# Patient Record
Sex: MALE | Race: WHITE | NOT HISPANIC OR LATINO | Employment: OTHER | URBAN - METROPOLITAN AREA
[De-identification: names, ages, dates, MRNs, and addresses within clinical notes are randomized per-mention and may not be internally consistent; named-entity substitution may affect disease eponyms.]

---

## 2024-09-23 ENCOUNTER — HOSPITAL ENCOUNTER (EMERGENCY)
Facility: HOSPITAL | Age: 29
Discharge: HOME | End: 2024-09-23
Attending: EMERGENCY MEDICINE

## 2024-09-23 VITALS
RESPIRATION RATE: 16 BRPM | HEIGHT: 74 IN | HEART RATE: 88 BPM | OXYGEN SATURATION: 95 % | WEIGHT: 240 LBS | BODY MASS INDEX: 30.8 KG/M2 | TEMPERATURE: 97 F | DIASTOLIC BLOOD PRESSURE: 95 MMHG | SYSTOLIC BLOOD PRESSURE: 165 MMHG

## 2024-09-23 DIAGNOSIS — R52 PAIN: ICD-10-CM
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
## 2024-09-23 DIAGNOSIS — S93.402A SPRAIN OF LEFT ANKLE, UNSPECIFIED LIGAMENT, INITIAL ENCOUNTER: Primary | ICD-10-CM

## 2024-09-23 PROCEDURE — 99283 EMERGENCY DEPT VISIT LOW MDM: CPT | Mod: 25

## 2024-09-23 RX ORDER — NAPROXEN 375 MG/1
375 TABLET ORAL 2 TIMES DAILY WITH MEALS
Qty: 20 TABLET | Refills: 0 | Status: SHIPPED | OUTPATIENT
Start: 2024-09-23 | End: 2024-10-03

## 2024-09-23 NOTE — DISCHARGE INSTRUCTIONS
Rest ice elevate extremity   Naprosyn as directed for pain and swelling  Follow up as directed   Return if condition becomes worse for any concerns

## 2024-09-23 NOTE — ED NOTES
Ace wrap applied to left foot/ankle.  Patient given crutches and educated on use.  After customizing size, patient demonstrated back correct usage.  Patient has no further questions or concerns.

## 2024-09-23 NOTE — ED PROVIDER NOTES
Encounter Date: 9/23/2024       History     Chief Complaint   Patient presents with    Ankle Injury     LEFT     29-year-old male presents emergency department with complaint of left ankle pain.  Patient states that he was attempting to do something with a trailer when he accidentally twisted his ankle.  Complaining of pain to the medial and lateral aspect of the ankle.  Patient reports that he has had previous ankle sprains.    The history is provided by the patient.     Review of patient's allergies indicates:  No Known Allergies  No past medical history on file.  No past surgical history on file.  No family history on file.     Review of Systems   Musculoskeletal:         Left ankle pain and swelling   All other systems reviewed and are negative.      Physical Exam     Initial Vitals [09/23/24 1343]   BP Pulse Resp Temp SpO2   (!) 165/95 88 16 97.4 °F (36.3 °C) 95 %      MAP       --         Physical Exam    Nursing note and vitals reviewed.  Constitutional: He appears well-developed and well-nourished.   HENT:   Head: Normocephalic and atraumatic.   Cardiovascular:  Normal rate and intact distal pulses.           Musculoskeletal:      Left ankle: Swelling and ecchymosis present. Tenderness present over the lateral malleolus and medial malleolus. No proximal fibula tenderness. Decreased range of motion.     Neurological: He is alert and oriented to person, place, and time. He has normal strength.   Skin: Skin is warm.   Ecchymosis noted to the medial aspect of the foot         ED Course   Splint Application    Date/Time: 9/23/2024 3:15 PM    Performed by: Charmaine Cook FNP  Authorized by: Jae Lama MD  Location details: left ankle  Supplies used: elastic bandage  Post-procedure: The splinted body part was neurovascularly unchanged following the procedure.  Patient tolerance: Patient tolerated the procedure well with no immediate complications  Comments: Patient provided crutches        Labs  Reviewed - No data to display       Imaging Results              X-Ray Foot Complete Left (Final result)  Result time 09/23/24 14:37:44      Final result by Davey Bradley DO (09/23/24 14:37:44)                   Impression:      No acute osseous abnormality      Electronically signed by: Davey Bradley  Date:    09/23/2024  Time:    14:37               Narrative:    EXAMINATION:  XR FOOT COMPLETE 3 VIEW LEFT    CLINICAL HISTORY:  Unspecified fall, initial encounter    FINDINGS:  Three views of the left foot show no fracture, dislocation, or destructive osseous lesion. Joint spaces of the foot are within normal limits.  No gross soft tissue abnormality.                                       X-Ray Ankle Complete Left (Final result)  Result time 09/23/24 14:30:09      Final result by Davey Bradley DO (09/23/24 14:30:09)                   Impression:      Mild soft tissue swelling of the ankle with no acute osseous abnormality.      Electronically signed by: Davey Bradley  Date:    09/23/2024  Time:    14:30               Narrative:    EXAMINATION:  XR ANKLE COMPLETE 3 VIEW LEFT    CLINICAL HISTORY:  Pain, unspecified    FINDINGS:  Three views of the left ankle show no fracture, dislocation, or destructive osseous lesion. There is mild soft tissue swelling about the ankle.                                       Medications - No data to display  Medical Decision Making  29-year-old male presents emergency department with complaint of left ankle pain.  Patient states that he was attempting to do something with a trailer when he accidentally twisted his ankle.  Complaining of pain to the medial and lateral aspect of the ankle.  Patient reports that he has had previous ankle sprains.    The history is provided by the patient.     Considerations include but not limited to, fracture, sprain, dislocation, contusion    29-year-old male presents emergency department reports that he accidentally twisted his ankle attempting  to lift a trailer.  Patient reports he has had previous sprains to the same ankle denies any previous fracture.  X-ray imaging of the ankle reveals no acute fracture or dislocation he does have evidence of soft tissue swelling noted on exam.  X-ray imaging of the foot reveals no acute fracture.  Patient will be discharged home with Ace wrap, crutches, given return precautions he was provided a prescription for Naprosyn, he was instructed to use crutches for ambulation, rest ice and elevate extremity    Amount and/or Complexity of Data Reviewed  Radiology: ordered. Decision-making details documented in ED Course.    Risk  Prescription drug management.              Attending Attestation:     Physician Attestation Statement for NP/PA:   I personally made/approved the management plan and take responsibility for the patient management.    Other NP/PA Attestation Additions:    History of Present Illness: 29-year-old male presents for ankle pain status post injury.    Medical Decision Making: Initial differential diagnosis included but not limited to fracture, dislocation, and sprain.  The patient's x-ray showed no acute bony abnormalities per Radiology.  The patient likely has an ankle sprain.  I did not examine this patient, but was available for consultation.  I am in agreement with the nurse practitioner's assessment, treatment, and plan of care.                                    Clinical Impression:  Final diagnoses:  [R52] Pain  [W19.XXXA] Fall  [S93.402A] Sprain of left ankle, unspecified ligament, initial encounter (Primary)          ED Disposition Condition    Discharge Stable          ED Prescriptions       Medication Sig Dispense Start Date End Date Auth. Provider    naproxen (NAPROSYN) 375 MG tablet Take 1 tablet (375 mg total) by mouth 2 (two) times daily with meals. for 10 days 20 tablet 9/23/2024 10/3/2024 Charmaine Cook FNP          Follow-up Information       Follow up With Specialties Details Why  Contact Chance Gillis MD Orthopedic Surgery, Surgery, Sports Medicine Schedule an appointment as soon as possible for a visit in 2 days  [address] [facility] [address]  [facility] [address]  [phone]               Charmaine Cook, MONET  09/23/24 1517       Jae Lama MD  09/23/24 1510